# Patient Record
(demographics unavailable — no encounter records)

---

## 2024-10-28 NOTE — REVIEW OF SYSTEMS
[Palpitations] : palpitations [Arthralgias] : arthralgias [Breast Pain] : breast pain [Negative] : Heme/Lymph

## 2024-11-04 NOTE — HISTORY OF PRESENT ILLNESS
[FreeTextEntry1] : This is a 75 yo female presenting today with right breast pain. Her daughter is Jaci Mejia. She had injury with her 3 yo grandson about 1 month ago. And since then she has had right breast soreness since then. She may have had some soreness prior to his elbow hitting her. She had right fatty cells removed by Dr. Adhikari twice about 30 years ago. Her breast pain has resolved.  She does SBE She has not noticed a change in her breast or a breast lump. She has not noticed a change in her nipple or nipple area. She has not noticed a change in the skin of the breast. She is not experiencing nipple discharge. She is not experiencing breast pain. She has not noticed a lump or lymph node under the armpit.   BREAST CANCER RISK FACTORS Menarche:  15 Menopause: 51 Grav:  2       Para: 2 Age at first live birth: 19  Nursed:  no Hysterectomy: no Oophorectomy: no OCP: yes 5y HRT: no  Last pap/pelvic exam: 2021 WNL Related family history:  no Ashkenazi: no Mastery risk assessment: BRCAPRO 4.5%, TCv7 3.2%, TCv8 3.2%, Prema 2.9%, Naseem NA Genetic testing: no Bra size: D   Last mammogram: 07/19/2024   Location: [place]  Report reviewed.           Images reviewed. Results: BIRADS 2  There are scattered areas of fibroglandular density.  No mammographic evidence of malignancy.   Last ultrasound: 07/19/2024     Location: [place]  Report reviewed.       Images reviewed. Results: BIRADS 2  No sonographic evidence of malignancy.    Last MRI:  none

## 2024-11-04 NOTE — HISTORY OF PRESENT ILLNESS
[FreeTextEntry1] : This is a 73 yo female presenting today with right breast pain. Her daughter is aJci Mejia. She had injury with her 3 yo grandson about 1 month ago. And since then she has had right breast soreness since then. She may have had some soreness prior to his elbow hitting her. She had right fatty cells removed by Dr. Adhikari twice about 30 years ago. Her breast pain has resolved.  She does SBE She has not noticed a change in her breast or a breast lump. She has not noticed a change in her nipple or nipple area. She has not noticed a change in the skin of the breast. She is not experiencing nipple discharge. She is not experiencing breast pain. She has not noticed a lump or lymph node under the armpit.   BREAST CANCER RISK FACTORS Menarche:  15 Menopause: 51 Grav:  2       Para: 2 Age at first live birth: 19  Nursed:  no Hysterectomy: no Oophorectomy: no OCP: yes 5y HRT: no  Last pap/pelvic exam: 2021 WNL Related family history:  no Ashkenazi: no Mastery risk assessment: BRCAPRO 4.5%, TCv7 3.2%, TCv8 3.2%, Prema 2.9%, Naseem NA Genetic testing: no Bra size: D   Last mammogram: 07/19/2024   Location: [place]  Report reviewed.           Images reviewed. Results: BIRADS 2  There are scattered areas of fibroglandular density.  No mammographic evidence of malignancy.   Last ultrasound: 07/19/2024     Location: [place]  Report reviewed.       Images reviewed. Results: BIRADS 2  No sonographic evidence of malignancy.    Last MRI:  none

## 2024-11-04 NOTE — PHYSICAL EXAM
[Normocephalic] : normocephalic [Atraumatic] : atraumatic [Supple] : supple [No Supraclavicular Adenopathy] : no supraclavicular adenopathy [Examined in the supine and seated position] : examined in the supine and seated position [Asymmetrical] : asymmetrical [Grade 3] : Ptosis Grade 3 [No dominant masses] : no dominant masses in right breast  [No dominant masses] : no dominant masses left breast [No Nipple Retraction] : no left nipple retraction [No Nipple Discharge] : no left nipple discharge [No Axillary Lymphadenopathy] : no left axillary lymphadenopathy [No Edema] : no edema [No Rashes] : no rashes [No Ulceration] : no ulceration [de-identified] : R>>L, there is some possible swelling at LIQ IMF in area of patient concern

## 2024-11-04 NOTE — ASSESSMENT
[FreeTextEntry1] : 75 yo female with right breast pain resolved plan mg/sono july 2025 rec arnica gel prn We reviewed risk reduction strategies including maintaining a BMI <25, limiting red meat intake and alcoholic beverages to 3 per week and exercise (150 min/ week low intensity or 75 min/week high intensity). And maintaining a normal vitamin D level  and stress management strategies.  She knows to call or return sooner should any concerns or questions arise.

## 2024-11-04 NOTE — PHYSICAL EXAM
[Normocephalic] : normocephalic [Atraumatic] : atraumatic [Supple] : supple [No Supraclavicular Adenopathy] : no supraclavicular adenopathy [Examined in the supine and seated position] : examined in the supine and seated position [Asymmetrical] : asymmetrical [Grade 3] : Ptosis Grade 3 [No dominant masses] : no dominant masses in right breast  [No dominant masses] : no dominant masses left breast [No Nipple Retraction] : no left nipple retraction [No Nipple Discharge] : no left nipple discharge [No Axillary Lymphadenopathy] : no left axillary lymphadenopathy [No Edema] : no edema [No Rashes] : no rashes [No Ulceration] : no ulceration [de-identified] : R>>L, there is some possible swelling at LIQ IMF in area of patient concern

## 2024-12-12 NOTE — PHYSICAL EXAM
[Well Developed] : well developed [Well Nourished] : well nourished [No Acute Distress] : no acute distress [Normal Venous Pressure] : normal venous pressure [No Carotid Bruit] : no carotid bruit [Normal S1, S2] : normal S1, S2 [No Murmur] : no murmur [No Rub] : no rub [No Gallop] : no gallop [Clear Lung Fields] : clear lung fields [Good Air Entry] : good air entry [No Respiratory Distress] : no respiratory distress  [Soft] : abdomen soft [Non Tender] : non-tender [No Masses/organomegaly] : no masses/organomegaly [Normal Bowel Sounds] : normal bowel sounds [Normal Gait] : normal gait [No Edema] : no edema [No Cyanosis] : no cyanosis [No Clubbing] : no clubbing [No Varicosities] : no varicosities [No Rash] : no rash [No Skin Lesions] : no skin lesions [Moves all extremities] : moves all extremities [No Focal Deficits] : no focal deficits [Normal Speech] : normal speech [Alert and Oriented] : alert and oriented [Normal memory] : normal memory [General Appearance - Well Developed] : well developed [Normal Appearance] : normal appearance [Well Groomed] : well groomed [General Appearance - Well Nourished] : well nourished [No Deformities] : no deformities [General Appearance - In No Acute Distress] : no acute distress [Normal Conjunctiva] : the conjunctiva exhibited no abnormalities [Eyelids - No Xanthelasma] : the eyelids demonstrated no xanthelasmas [Normal Oral Mucosa] : normal oral mucosa [No Oral Pallor] : no oral pallor [No Oral Cyanosis] : no oral cyanosis [Normal Jugular Venous A Waves Present] : normal jugular venous A waves present [Normal Jugular Venous V Waves Present] : normal jugular venous V waves present [No Jugular Venous Ingacio A Waves] : no jugular venous ignacio A waves [Exaggerated Use Of Accessory Muscles For Inspiration] : no accessory muscle use [Respiration, Rhythm And Depth] : normal respiratory rhythm and effort [Auscultation Breath Sounds / Voice Sounds] : lungs were clear to auscultation bilaterally [Abdomen Soft] : soft [Abdomen Tenderness] : non-tender [Abdomen Mass (___ Cm)] : no abdominal mass palpated [Abnormal Walk] : normal gait [Gait - Sufficient For Exercise Testing] : the gait was sufficient for exercise testing [Nail Clubbing] : no clubbing of the fingernails [Cyanosis, Localized] : no localized cyanosis [Petechial Hemorrhages (___cm)] : no petechial hemorrhages [Skin Color & Pigmentation] : normal skin color and pigmentation [] : no rash [No Venous Stasis] : no venous stasis [Skin Lesions] : no skin lesions [No Skin Ulcers] : no skin ulcer [No Xanthoma] : no  xanthoma was observed [Oriented To Time, Place, And Person] : oriented to person, place, and time [Affect] : the affect was normal [Mood] : the mood was normal [No Anxiety] : not feeling anxious [de-identified] : 2/6 [employer] at [employer] and [employer]

## 2024-12-12 NOTE — ASSESSMENT
[FreeTextEntry1] : ## Elevated D Dimer Had chest pain ~2 weeks ago-> went to ED-> which showed D Dimer ~ 800s CT chest- NO PE No prior h/o DVT, PE Has had "superficial vein thrombosis" in LE which resolved after vein stripping No personal or family h/o miscarriages Her daughter had DVT. Not sure if she had blood work. Daughter was on birth control Patient never was on birth control Has SLE since 2017-> on plaquenil, azathioprine Mammogram 2021- normal. Was recommend repeat in 12 months Colonoscopy 2016 at [facility]-> had benign polyps. Was scheduled for repeat in 5 years but had to cancel due to COVID 9/2024 CTA and doppler - neg for clot  Patient is here for follow up and review labs Currently on Dicyclomine for IBS  -Labs are drawn in the office, reviewed, analyzed, and discussed D dimer remains elevated  - stressed on signs and symptoms of VTEs  Given she never had CT A/P, she is agreeable to have it done to r/o other potential etiology of elevated D dimer Explained that her D-Dimer can be elevated due inflammation/infection but close signs and symptoms of VTEs stressed. She voices understanding.  -Encouraged to be uptodate with screenings  #hypokalemia K 2.7 - been taking KCL 20meq 2x/day since Monday 12/9/2024 (was on KCL 20meq 3x/day) by her cardiologist Advised to go to closest ER ( [facility])to have K repletion as needed and she voices understanding.   #low iron - Ferritin - 17--> 33 (improved since she started on Slow FE) s/p colonoscopy with GI 12/11/2024  -Labs are drawn in the office, reviewed, analyzed, and discussed Hgb is acceptable, Ferritin is pending - will f/u with results.   Patient and her daughter had multiple questions which were answered to satisfaction  d/w Dr. Gomez  rtc in 6 months for follow up CBC, CMP, Iron panel, Ferritin, D dimer.

## 2024-12-12 NOTE — REASON FOR VISIT
[FreeTextEntry1] : 1. Essential Hypertension.  2. Treated Hypothyroidism.  3. Anemia. Iron Def-per heme 4. Hyperlipidemia.  5. Limited Scleroderma/CREST.  6: Palpitation

## 2024-12-12 NOTE — HISTORY OF PRESENT ILLNESS
[de-identified] : Ms. Shanda Mejia is 73 years old female with elevated D dimer here for consultation, referred by Dr. Alexis accompanied by her daughter Jaci   Patient with hx of aortic stenosis, positive TEE, GERD, HTN, goiter, SLE, OA, osteoporosis, scleroderma who had 'hurting feeling' on her mid upper chest, went to [facility]  on 3/19/2024 with normal CT but had elevated D dimer of 828 Patient had multiple superficial blood clots in the past, had varicose stripping, seen by vascular surgeon Dr. Tinajero.  No personal or family  hx of miscarriage  3/19/2024 WBC 4.59 H/H 12.8/38.6 MCV 90.0 1/18/2024 H/H - 13.3/40.1 MCV 92  SurgHx Knee replacement in 2019 without any complication  FHx: Mother with cancer (spine??) M. aunt with lung cancer (smoker) M. uncle with lung cancer (smoker) 1st cousin with breast cancer   SocialHx: never smoked Denies any alcohol intake Denies any illicit drugs Lives with daughter Jaci  Colonoscopy - > 6 years ago Mammogram - 2019  Vaccinations: Covid + Flu (not this year) pneumonia/shingles - declined HCP - not yet addressed  Denies pain, diarrhea, bleeding, fever, SOB/JUAREZ,   [de-identified] : Patient is here for follow up  She is doing well and has no complaints,  She's been taking 20 meq KCL 2x/day since 12/9/20024, previously on KCL 20meq 3x/day as recommended by Dr. Carbajal (cardiology) Denies any signs or symptoms of VTEs. She's been taking slow FE daily  s/p colonoscopy on 12/9/2024

## 2024-12-12 NOTE — CONSULT LETTER
[Dear  ___] : Dear  [unfilled], [Consult Letter:] : I had the pleasure of evaluating your patient, [unfilled]. [Please see my note below.] : Please see my note below. [Consult Closing:] : Thank you very much for allowing me to participate in the care of this patient.  If you have any questions, please do not hesitate to contact me. [Sincerely,] : Sincerely, [FreeTextEntry3] : Gabino Gomez MD, MPH  of Medicine [facility] [employer] at [facility] Attending Physician  Hematology and Medical Oncology [facility]

## 2024-12-12 NOTE — REVIEW OF SYSTEMS
[Fatigue] : fatigue [Negative] : Allergic/Immunologic [FreeTextEntry2] : 10 point review of systems negative except as outlined in HPI

## 2024-12-12 NOTE — ASSESSMENT
[FreeTextEntry1] : Assessment Encounter for preventive health examination (V70.0) (Z00.00) Hypertension (401.9) (I10) Dyslipidemia (272.4) (E78.5) Acquired hypothyroidism (244.9) (E03.9) Aortic stenosis (424.1) (I35.0) H/O systemic lupus erythematosus (SLE) (710.0) (M32.9) Assessment:  1. Essential hypertension - I10 (Primary)  2. Mixed hyperlipidemia - E78.2  3. Hypothyroid - E03.9  4. Limited scleroderma - M34.9  5. Palpitations-recurrent with several workups in the past for the same thing.  Her symptoms are real and due to benign isolated PVCs.  Several prior echocardiograms have revealed normal LV function and no significant valve abnormalities.  In addition, 24-hour Holter monitor, and prolonged 2-week recordings have revealed no abnormalities aside from isolated ectopy.  Her physical exam today is unremarkable as is her EKG.  Will reassure patient, check labs, and see her again in 3 months.  Sooner if necessary. 6) Recurrent hypokalemia    Comments:  1) HTN is controlled, and we will therefore continue Nifedipine, Atenolol, HCT. However, leg edema is significant and likely related to nifedipine; previously reduced from 60 to 30 as BP has been well controlled, and edema better  2) Mixed hyperlipidemia and lipids are at goal.  3)Hypothyroidism, on replacement therapy, clinically euthyroid. Recent TFTs were good  4) Hepatitis C, s/p interferon, in remission, per GI  5)limited SCLERODERMA with Hyperproteinemia, Hypergammaglobulinemia, Polyclonal gammopathy, + TEE.  6) **Palpations, due to isolated, frequent, but LOW GRADE PVCs, as before. Related to Hypertensive heart dx  -They are chronic, low grade, associated with nl LV function, and no CAD--a very benign and low risk profile.  Already on BB. No other treatment is indicated or needed. Reassurance  7)***Aortic stenosis--murmur more prominent. But echo confirms AS is mild/minimal.  8) ***Recurrent hypokalemia related to chlorthalidone. Aggravated by colonoscopy and diarrhea. Will d/c Atenolol Chlorthalidone and replace with Atenolol and separate triamterene/hydrochloride 37.5/25

## 2024-12-12 NOTE — HISTORY OF PRESENT ILLNESS
[FreeTextEntry1] : The patient is a 74  year-old woman, recently retired, well known to me for many years of care.         Scenario:She has a known history of:        1) hypertension--on atenolol-chlorthalidone and Nifedipine.        2) mixed hyperlipidemia, on atorvastatin 10 mg.        3) hypothyroidism and is on replacement therapy.         4) she has a remote history of hepatitis C that was treated with interferon and she is now felt to be in remission. Her GI- DR. Agee said there was no recurrence, but she has a "fatty LIver" and to lose weight. Colonoscopy was normal.        ===========================================   Note: Recent labs reveal an elevated total protein. 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 I referred her to Dr. Trammell and subsequently rheumatology (DR. Alexis). TEE is positive at 1: 2560 (Homogeneous pattern) and Anti_DS DNA 1:1000. Diagnosis is LIMITED SCLERODERMA--formerly CREST Syndrome. Now, on Plaquenil.      =============================================================================    She reported previously that she was experiencing frequent and bothersome palpitations--she feels that "stress" at home may be aggravating this. She is aware of intermittent skips, normal beats, and a sense that her heart is racing and irregular. No chest pains. But recently, these symptoms have improved. Had uneventful bilateral cataract surgery.   Workup of these new symptoms: 1)MOST RECENT ECHO:   8/16/2024 Nl LV EF 65%. No LVH sclerotic Tri leaflet aortic valve. Peak gradient 17, mean 9.5, aortic valve area 1.59 cm there is no AS!! Normal mitral valve structure. Mild MR mild left atrial enlargement compared to prior study: 11/17/2022Sclerotic AV wih peak 22.89/mean 9. Very mild, trivial AS therefore: no significant change ================================================================================================================================ 2) Holter:3/14/19    NSR with average HR 73 ( 57--106)                                Frequent, monomorphic PVC's--isolated, and as bigeminy. ! couplet. No VT                                 single PACs                                 No pauses, events. ***3) 2 week Zio recording (August 2021): NSR throughout with average heart rate 68 (range 54--93). Rare isolated PACs Rare isolated PVCs No SVT, VT, or PAF. No pauses or advanced. Conclusion: Normal two-week Zio recording ====================================================================================== NB: Shanda lost her  to pancreatic cancer.     Today: She was seen yesterday by hematology and routine K+ was just 2.7 She went to [facility] and had Potassium administered, kept overnight, and discharged this AM with K+ up to 3.1 She has been having diarrhea and is seeing GI. Also had colonoscopy recently  Clinically: She is feeling well. Taking and tolerating medicines. Her appetite has remained stable, as has her sleeping pattern She saw Dr. Hellerman, and was treated for IBS by Dr. Jose Chen **But, noticing worsening lower leg edema which she feels may be making ambulation more difficult  ECG performed due to symptoms of palpitations, as part of this evaluation:  NSR   nl axis 1st degree AVB      Increased volage   NSC from last ecg

## 2024-12-12 NOTE — ASSESSMENT
[FreeTextEntry1] : ## Elevated D Dimer Had chest pain ~2 weeks ago-> went to ED-> which showed D Dimer ~ 800s CT chest- NO PE No prior h/o DVT, PE Has had "superficial vein thrombosis" in LE which resolved after vein stripping No personal or family h/o miscarriages Her daughter had DVT. Not sure if she had blood work. Daughter was on birth control Patient never was on birth control Has SLE since 2017-> on plaquenil, azathioprine Mammogram 2021- normal. Was recommend repeat in 12 months Colonoscopy 2016 at [facility]-> had benign polyps. Was scheduled for repeat in 5 years but had to cancel due to COVID 9/2024 CTA and doppler - neg for clot  Patient is here for follow up and review labs Currently on Dicyclomine for IBS  -Labs are drawn in the office, reviewed, analyzed, and discussed D dimer remains elevated  - stressed on signs and symptoms of VTEs  Given she never had CT A/P, she is agreeable to have it done to r/o other potential etiology of elevated D dimer Explained that her D-Dimer can be elevated due inflammation/infection but close signs and symptoms of VTEs stressed. She voices understanding.  -Encouraged to be uptodate with screenings  #hypokalemia K 2.7 - been taking KCL 20meq 2x/day since Monday 12/9/2024 (was on KCL 20meq 3x/day) by her cardiologist Advised to go to closest ER ( [facility])to have K repletion as needed and she voices understanding.   #low iron - Ferritin - 17--> 33 (improved since she started on Slow FE) s/p colonoscopy with GI 12/11/2024  -Labs are drawn in the office, reviewed, analyzed, and discussed Hgb is acceptable, Ferritin is pending - will f/u with results.   Patient and her daughter had multiple questions which were answered to satisfaction  d/w Dr. Gomez  rtc in 6 months for follow up CBC, CMP, Iron panel, Ferritin, D dimer.

## 2024-12-12 NOTE — CONSULT LETTER
[Dear  ___] : Dear  [unfilled], [Consult Letter:] : I had the pleasure of evaluating your patient, [unfilled]. [Please see my note below.] : Please see my note below. [Consult Closing:] : Thank you very much for allowing me to participate in the care of this patient.  If you have any questions, please do not hesitate to contact me. [Sincerely,] : Sincerely, [FreeTextEntry3] : Gabino Gomez MD, MPH  of Medicine [facility] at [facility] Attending Physician  Hematology and Medical Oncology [facility]

## 2024-12-12 NOTE — HISTORY OF PRESENT ILLNESS
[FreeTextEntry1] : The patient is a 74  year-old woman, recently retired, well known to me for many years of care.         Scenario:She has a known history of:        1) hypertension--on atenolol-chlorthalidone and Nifedipine.        2) mixed hyperlipidemia, on atorvastatin 10 mg.        3) hypothyroidism and is on replacement therapy.         4) she has a remote history of hepatitis C that was treated with interferon and she is now felt to be in remission. Her GI- DR. Agee said there was no recurrence, but she has a "fatty LIver" and to lose weight. Colonoscopy was normal.        ===========================================   Note: Recent labs reveal an elevated total protein. 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 I referred her to Dr. Trammell and subsequently rheumatology (DR. Alexis). TEE is positive at 1: 2560 (Homogeneous pattern) and Anti_DS DNA 1:1000. Diagnosis is LIMITED SCLERODERMA--formerly CREST Syndrome. Now, on Plaquenil.      =============================================================================    She reported previously that she was experiencing frequent and bothersome palpitations--she feels that "stress" at home may be aggravating this. She is aware of intermittent skips, normal beats, and a sense that her heart is racing and irregular. No chest pains. But recently, these symptoms have improved. Had uneventful bilateral cataract surgery.   Workup of these new symptoms: 1)MOST RECENT ECHO:   8/16/2024 Nl LV EF 65%. No LVH sclerotic Tri leaflet aortic valve. Peak gradient 17, mean 9.5, aortic valve area 1.59 cm there is no AS!! Normal mitral valve structure. Mild MR mild left atrial enlargement compared to prior study: 11/17/2022Sclerotic AV wih peak 22.89/mean 9. Very mild, trivial AS therefore: no significant change ================================================================================================================================ 2) Holter:3/14/19    NSR with average HR 73 ( 57--106)                                Frequent, monomorphic PVC's--isolated, and as bigeminy. ! couplet. No VT                                 single PACs                                 No pauses, events. ***3) 2 week Zio recording (August 2021): NSR throughout with average heart rate 68 (range 54--93). Rare isolated PACs Rare isolated PVCs No SVT, VT, or PAF. No pauses or advanced. Conclusion: Normal two-week Zio recording ====================================================================================== NB: Shanda lost her  to pancreatic cancer.     Today: She was seen yesterday by hematology and routine K+ was just 2.7 She went to [facility] and had Potassium administered, kept overnight, and discharged this AM with K+ up to 3.1 She has been having diarrhea and is seeing GI. Also had colonoscopy recently  Clinically: She is feeling well. Taking and tolerating medicines. Her appetite has remained stable, as has her sleeping pattern She saw Dr. Hellerman, and was treated for IBS by Dr. Joes Chen **But, noticing worsening lower leg edema which she feels may be making ambulation more difficult  ECG performed due to symptoms of palpitations, as part of this evaluation:  NSR   nl axis 1st degree AVB      Increased volage   NSC from last ecg

## 2024-12-12 NOTE — HISTORY OF PRESENT ILLNESS
[de-identified] : Ms. Shanda Mejia is 73 years old female with elevated D dimer here for consultation, referred by Dr. Alexis accompanied by her daughter Jaci   Patient with hx of aortic stenosis, positive TEE, GERD, HTN, goiter, SLE, OA, osteoporosis, scleroderma who had 'hurting feeling' on her mid upper chest, went to [facility]  on 3/19/2024 with normal CT but had elevated D dimer of 828 Patient had multiple superficial blood clots in the past, had varicose stripping, seen by vascular surgeon Dr. Tinajero.  No personal or family  hx of miscarriage  3/19/2024 WBC 4.59 H/H 12.8/38.6 MCV 90.0 1/18/2024 H/H - 13.3/40.1 MCV 92  SurgHx Knee replacement in 2019 without any complication  FHx: Mother with cancer (spine??) M. aunt with lung cancer (smoker) M. uncle with lung cancer (smoker) 1st cousin with breast cancer   SocialHx: never smoked Denies any alcohol intake Denies any illicit drugs Lives with daughter Jaci  Colonoscopy - > 6 years ago Mammogram - 2019  Vaccinations: Covid + Flu (not this year) pneumonia/shingles - declined HCP - not yet addressed  Denies pain, diarrhea, bleeding, fever, SOB/JUAREZ,   [de-identified] : Patient is here for follow up  She is doing well and has no complaints,  She's been taking 20 meq KCL 2x/day since 12/9/20024, previously on KCL 20meq 3x/day as recommended by Dr. Carbajal (cardiology) Denies any signs or symptoms of VTEs. She's been taking slow FE daily  s/p colonoscopy on 12/9/2024

## 2024-12-12 NOTE — PHYSICAL EXAM
[Well Developed] : well developed [Well Nourished] : well nourished [No Acute Distress] : no acute distress [Normal Venous Pressure] : normal venous pressure [No Carotid Bruit] : no carotid bruit [Normal S1, S2] : normal S1, S2 [No Murmur] : no murmur [No Rub] : no rub [No Gallop] : no gallop [Clear Lung Fields] : clear lung fields [Good Air Entry] : good air entry [No Respiratory Distress] : no respiratory distress  [Soft] : abdomen soft [Non Tender] : non-tender [No Masses/organomegaly] : no masses/organomegaly [Normal Bowel Sounds] : normal bowel sounds [Normal Gait] : normal gait [No Edema] : no edema [No Cyanosis] : no cyanosis [No Clubbing] : no clubbing [No Varicosities] : no varicosities [No Rash] : no rash [No Skin Lesions] : no skin lesions [Moves all extremities] : moves all extremities [No Focal Deficits] : no focal deficits [Normal Speech] : normal speech [Alert and Oriented] : alert and oriented [Normal memory] : normal memory [General Appearance - Well Developed] : well developed [Normal Appearance] : normal appearance [Well Groomed] : well groomed [General Appearance - Well Nourished] : well nourished [No Deformities] : no deformities [Normal Conjunctiva] : the conjunctiva exhibited no abnormalities [General Appearance - In No Acute Distress] : no acute distress [Eyelids - No Xanthelasma] : the eyelids demonstrated no xanthelasmas [Normal Oral Mucosa] : normal oral mucosa [No Oral Pallor] : no oral pallor [No Oral Cyanosis] : no oral cyanosis [Normal Jugular Venous A Waves Present] : normal jugular venous A waves present [Normal Jugular Venous V Waves Present] : normal jugular venous V waves present [No Jugular Venous Ignacio A Waves] : no jugular venous ignacio A waves [Exaggerated Use Of Accessory Muscles For Inspiration] : no accessory muscle use [Respiration, Rhythm And Depth] : normal respiratory rhythm and effort [Auscultation Breath Sounds / Voice Sounds] : lungs were clear to auscultation bilaterally [Abdomen Soft] : soft [Abdomen Tenderness] : non-tender [Abdomen Mass (___ Cm)] : no abdominal mass palpated [Abnormal Walk] : normal gait [Gait - Sufficient For Exercise Testing] : the gait was sufficient for exercise testing [Nail Clubbing] : no clubbing of the fingernails [Cyanosis, Localized] : no localized cyanosis [Petechial Hemorrhages (___cm)] : no petechial hemorrhages [Skin Color & Pigmentation] : normal skin color and pigmentation [] : no rash [No Venous Stasis] : no venous stasis [Skin Lesions] : no skin lesions [No Skin Ulcers] : no skin ulcer [No Xanthoma] : no  xanthoma was observed [Oriented To Time, Place, And Person] : oriented to person, place, and time [Affect] : the affect was normal [Mood] : the mood was normal [No Anxiety] : not feeling anxious [de-identified] : 2/[employer] and [employer]

## 2024-12-12 NOTE — CONSULT LETTER
[Dear  ___] : Dear  [unfilled], [Consult Letter:] : I had the pleasure of evaluating your patient, [unfilled]. [Please see my note below.] : Please see my note below. [Consult Closing:] : Thank you very much for allowing me to participate in the care of this patient.  If you have any questions, please do not hesitate to contact me. [Sincerely,] : Sincerely, [FreeTextEntry3] : Gabino Gomez MD, MPH  of Medicine [facility] at [facility] Attending Physician  Hematology and Medical Oncology [facility]

## 2024-12-12 NOTE — ASSESSMENT
[FreeTextEntry1] : ## Elevated D Dimer Had chest pain ~2 weeks ago-> went to ED-> which showed D Dimer ~ 800s CT chest- NO PE No prior h/o DVT, PE Has had "superficial vein thrombosis" in LE which resolved after vein stripping No personal or family h/o miscarriages Her daughter had DVT. Not sure if she had blood work. Daughter was on birth control Patient never was on birth control Has SLE since 2017-> on plaquenil, azathioprine Mammogram 2021- normal. Was recommend repeat in 12 months Colonoscopy 2016 at [facility]-> had benign polyps. Was scheduled for repeat in 5 years but had to cancel due to COVID 9/2024 CTA and doppler - neg for clot  Patient is here for follow up and review labs Currently on Dicyclomine for IBS  -Labs are drawn in the office, reviewed, analyzed, and discussed D dimer remains elevated  - stressed on signs and symptoms of VTEs  Given she never had CT A/P, she is agreeable to have it done to r/o other potential etiology of elevated D dimer Explained that her D-Dimer can be elevated due inflammation/infection but close signs and symptoms of VTEs stressed. She voices understanding.  -Encouraged to be uptodate with screenings  #hypokalemia K 2.7 - been taking KCL 20meq 2x/day since Monday 12/9/2024 (was on KCL 20meq 3x/day) by her cardiologist Advised to go to closest ER ( [facility])to have K repletion as needed and she voices understanding.   #low iron - Ferritin - 17--> 33 (improved since she started on Slow FE) s/p colonoscopy with GI 12/11/2024  -Labs are drawn in the office, reviewed, analyzed, and discussed Hgb is acceptable, Ferritin is pending - will f/u with results.   Patient and her daughter had multiple questions which were answered to satisfaction  d/w Dr. Gomez  rtc in 6 months for follow up CBC, CMP, Iron panel, Ferritin, D dimer.

## 2024-12-12 NOTE — IMPRESSION
[FreeTextEntry1] : Zio recording: \par  NSR with average HR 68 (range: 54-93)\par  Rare isolated PACs\par  Rare isolated PVCs\par  No SVT, No VT, No PAF\par  No pauses or events\par  Conc: normal 2 week recording

## 2024-12-12 NOTE — HISTORY OF PRESENT ILLNESS
[de-identified] : Ms. Shanda Mejia is 73 years old female with elevated D dimer here for consultation, referred by Dr. Alexis accompanied by her daughter Jaci   Patient with hx of aortic stenosis, positive TEE, GERD, HTN, goiter, SLE, OA, osteoporosis, scleroderma who had 'hurting feeling' on her mid upper chest, went to [facility]  on 3/19/2024 with normal CT but had elevated D dimer of 828 Patient had multiple superficial blood clots in the past, had varicose stripping, seen by vascular surgeon Dr. Tinajero.  No personal or family  hx of miscarriage  3/19/2024 WBC 4.59 H/H 12.8/38.6 MCV 90.0 1/18/2024 H/H - 13.3/40.1 MCV 92  SurgHx Knee replacement in 2019 without any complication  FHx: Mother with cancer (spine??) M. aunt with lung cancer (smoker) M. uncle with lung cancer (smoker) 1st cousin with breast cancer   SocialHx: never smoked Denies any alcohol intake Denies any illicit drugs Lives with daughter Jaci  Colonoscopy - > 6 years ago Mammogram - 2019  Vaccinations: Covid + Flu (not this year) pneumonia/shingles - declined HCP - not yet addressed  Denies pain, diarrhea, bleeding, fever, SOB/JUAREZ,   [de-identified] : Patient is here for follow up  She is doing well and has no complaints,  She's been taking 20 meq KCL 2x/day since 12/9/20024, previously on KCL 20meq 3x/day as recommended by Dr. Carbajal (cardiology) Denies any signs or symptoms of VTEs. She's been taking slow FE daily  s/p colonoscopy on 12/9/2024

## 2025-02-13 NOTE — PHYSICAL EXAM
[Well Developed] : well developed [Well Nourished] : well nourished [No Acute Distress] : no acute distress [Normal Venous Pressure] : normal venous pressure [No Carotid Bruit] : no carotid bruit [Normal S1, S2] : normal S1, S2 [No Murmur] : no murmur [No Rub] : no rub [No Gallop] : no gallop [Clear Lung Fields] : clear lung fields [Good Air Entry] : good air entry [No Respiratory Distress] : no respiratory distress  [Soft] : abdomen soft [Non Tender] : non-tender [No Masses/organomegaly] : no masses/organomegaly [Normal Bowel Sounds] : normal bowel sounds [Normal Gait] : normal gait [No Edema] : no edema [No Cyanosis] : no cyanosis [No Clubbing] : no clubbing [No Varicosities] : no varicosities [No Rash] : no rash [No Skin Lesions] : no skin lesions [Moves all extremities] : moves all extremities [No Focal Deficits] : no focal deficits [Normal Speech] : normal speech [Alert and Oriented] : alert and oriented [Normal memory] : normal memory [General Appearance - Well Developed] : well developed [Normal Appearance] : normal appearance [Well Groomed] : well groomed [General Appearance - Well Nourished] : well nourished [No Deformities] : no deformities [General Appearance - In No Acute Distress] : no acute distress [Normal Conjunctiva] : the conjunctiva exhibited no abnormalities [Eyelids - No Xanthelasma] : the eyelids demonstrated no xanthelasmas [Normal Oral Mucosa] : normal oral mucosa [No Oral Pallor] : no oral pallor [No Oral Cyanosis] : no oral cyanosis [Normal Jugular Venous A Waves Present] : normal jugular venous A waves present [Normal Jugular Venous V Waves Present] : normal jugular venous V waves present [No Jugular Venous Ignacio A Waves] : no jugular venous ignacio A waves [Respiration, Rhythm And Depth] : normal respiratory rhythm and effort [Exaggerated Use Of Accessory Muscles For Inspiration] : no accessory muscle use [Auscultation Breath Sounds / Voice Sounds] : lungs were clear to auscultation bilaterally [Abdomen Soft] : soft [Abdomen Tenderness] : non-tender [Abdomen Mass (___ Cm)] : no abdominal mass palpated [Abnormal Walk] : normal gait [Gait - Sufficient For Exercise Testing] : the gait was sufficient for exercise testing [Nail Clubbing] : no clubbing of the fingernails [Cyanosis, Localized] : no localized cyanosis [Petechial Hemorrhages (___cm)] : no petechial hemorrhages [Skin Color & Pigmentation] : normal skin color and pigmentation [] : no rash [No Venous Stasis] : no venous stasis [Skin Lesions] : no skin lesions [No Skin Ulcers] : no skin ulcer [No Xanthoma] : no  xanthoma was observed [Oriented To Time, Place, And Person] : oriented to person, place, and time [Affect] : the affect was normal [Mood] : the mood was normal [No Anxiety] : not feeling anxious [de-identified] : 2/[employer] and [employer]

## 2025-02-13 NOTE — PHYSICAL EXAM
[Well Developed] : well developed [Well Nourished] : well nourished [No Acute Distress] : no acute distress [Normal Venous Pressure] : normal venous pressure [No Carotid Bruit] : no carotid bruit [Normal S1, S2] : normal S1, S2 [No Murmur] : no murmur [No Rub] : no rub [No Gallop] : no gallop [Clear Lung Fields] : clear lung fields [Good Air Entry] : good air entry [No Respiratory Distress] : no respiratory distress  [Soft] : abdomen soft [Non Tender] : non-tender [No Masses/organomegaly] : no masses/organomegaly [Normal Bowel Sounds] : normal bowel sounds [Normal Gait] : normal gait [No Edema] : no edema [No Cyanosis] : no cyanosis [No Clubbing] : no clubbing [No Varicosities] : no varicosities [No Rash] : no rash [No Skin Lesions] : no skin lesions [Moves all extremities] : moves all extremities [No Focal Deficits] : no focal deficits [Normal Speech] : normal speech [Alert and Oriented] : alert and oriented [Normal memory] : normal memory [General Appearance - Well Developed] : well developed [Normal Appearance] : normal appearance [Well Groomed] : well groomed [General Appearance - Well Nourished] : well nourished [No Deformities] : no deformities [General Appearance - In No Acute Distress] : no acute distress [Normal Conjunctiva] : the conjunctiva exhibited no abnormalities [Eyelids - No Xanthelasma] : the eyelids demonstrated no xanthelasmas [Normal Oral Mucosa] : normal oral mucosa [No Oral Pallor] : no oral pallor [No Oral Cyanosis] : no oral cyanosis [Normal Jugular Venous A Waves Present] : normal jugular venous A waves present [Normal Jugular Venous V Waves Present] : normal jugular venous V waves present [No Jugular Venous Ignacio A Waves] : no jugular venous ignacio A waves [Respiration, Rhythm And Depth] : normal respiratory rhythm and effort [Exaggerated Use Of Accessory Muscles For Inspiration] : no accessory muscle use [Auscultation Breath Sounds / Voice Sounds] : lungs were clear to auscultation bilaterally [Abdomen Soft] : soft [Abdomen Tenderness] : non-tender [Abdomen Mass (___ Cm)] : no abdominal mass palpated [Abnormal Walk] : normal gait [Gait - Sufficient For Exercise Testing] : the gait was sufficient for exercise testing [Nail Clubbing] : no clubbing of the fingernails [Cyanosis, Localized] : no localized cyanosis [Petechial Hemorrhages (___cm)] : no petechial hemorrhages [Skin Color & Pigmentation] : normal skin color and pigmentation [] : no rash [No Venous Stasis] : no venous stasis [Skin Lesions] : no skin lesions [No Skin Ulcers] : no skin ulcer [No Xanthoma] : no  xanthoma was observed [Oriented To Time, Place, And Person] : oriented to person, place, and time [Affect] : the affect was normal [Mood] : the mood was normal [No Anxiety] : not feeling anxious [de-identified] : 2/[employer] and [employer]

## 2025-02-13 NOTE — HISTORY OF PRESENT ILLNESS
[FreeTextEntry1] : The patient is a 74  year-old woman, recently retired, well known to me for many years of care.         Scenario:She has a known history of:        1) hypertension--on atenolol-chlorthalidone and Nifedipine.        2) mixed hyperlipidemia, on atorvastatin 10 mg.        3) hypothyroidism and is on replacement therapy.         4) she has a remote history of hepatitis C that was treated with interferon and she is now felt to be in remission. Her GI- DR. Agee said there was no recurrence, but she has a "fatty LIver" and to lose weight. Colonoscopy was normal.        ===========================================   Note: Recent labs reveal an elevated total protein. 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 I referred her to Dr. Trammell and subsequently rheumatology (DR. Alexis). TEE is positive at 1: 2560 (Homogeneous pattern) and Anti_DS DNA 1:1000. Diagnosis is LIMITED SCLERODERMA--formerly CREST Syndrome. Now, on Plaquenil.      =============================================================================    She reported previously that she was experiencing frequent and bothersome palpitations--she feels that "stress" at home may be aggravating this. She is aware of intermittent skips, normal beats, and a sense that her heart is racing and irregular. No chest pains. But recently, these symptoms have improved. Had uneventful bilateral cataract surgery.   Workup of these new symptoms: 1)MOST RECENT ECHO:   8/16/2024 Nl LV EF 65%. No LVH sclerotic Tri leaflet aortic valve. Peak gradient 17, mean 9.5, aortic valve area 1.59 cm there is no AS!! Normal mitral valve structure. Mild MR mild left atrial enlargement compared to prior study: 11/17/2022Sclerotic AV wih peak 22.89/mean 9. Very mild, trivial AS therefore: no significant change ================================================================================================================================ 2) Holter:3/14/19    NSR with average HR 73 ( 57--106)                                Frequent, monomorphic PVC's--isolated, and as bigeminy. ! couplet. No VT                                 single PACs                                 No pauses, events. ***3) 2 week Zio recording (August 2021): NSR throughout with average heart rate 68 (range 54--93). Rare isolated PACs Rare isolated PVCs No SVT, VT, or PAF. No pauses or advanced. Conclusion: Normal two-week Zio recording ====================================================================================== NB: Shanda lost her  to pancreatic cancer.     Today: She was seen yesterday by hematology and routine K+ was just 2.7 She went to [facility] and had Potassium administered, kept overnight, and discharged this AM with K+ up to 3.1 She has been having diarrhea and is seeing GI. Also had colonoscopy recently  Clinically: She is feeling well. Taking and tolerating medicines. Her appetite has remained stable, as has her sleeping pattern She saw Dr. Hellerman, and was treated for IBS by Dr. Jose Chen **But, noticing worsening lower leg edema which she feels may be making ambulation more difficult  ECG performed due to symptoms of palpitations, as part of this evaluation:  NSR   nl axis 1st degree AVB      Increased volage   NSC from last ecg

## 2025-02-13 NOTE — HISTORY OF PRESENT ILLNESS
[FreeTextEntry1] : The patient is a 74  year-old woman, recently retired, well known to me for many years of care.         Scenario:She has a known history of:        1) hypertension--on atenolol-chlorthalidone and Nifedipine.        2) mixed hyperlipidemia, on atorvastatin 10 mg.        3) hypothyroidism and is on replacement therapy.         4) she has a remote history of hepatitis C that was treated with interferon and she is now felt to be in remission. Her GI- DR. Agee said there was no recurrence, but she has a "fatty LIver" and to lose weight. Colonoscopy was normal.        ===========================================   Note: Recent labs reveal an elevated total protein. 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 I referred her to Dr. Trammell and subsequently rheumatology (DR. Alexis). TEE is positive at 1: 2560 (Homogeneous pattern) and Anti_DS DNA 1:1000. Diagnosis is LIMITED SCLERODERMA--formerly CREST Syndrome. Now, on Plaquenil.      =============================================================================    She reported previously that she was experiencing frequent and bothersome palpitations--she feels that "stress" at home may be aggravating this. She is aware of intermittent skips, normal beats, and a sense that her heart is racing and irregular. No chest pains. But recently, these symptoms have improved. Had uneventful bilateral cataract surgery.   Workup of these new symptoms: 1)MOST RECENT ECHO:   8/16/2024 Nl LV EF 65%. No LVH sclerotic Tri leaflet aortic valve. Peak gradient 17, mean 9.5, aortic valve area 1.59 cm there is no AS!! Normal mitral valve structure. Mild MR mild left atrial enlargement compared to prior study: 11/17/2022Sclerotic AV wih peak 22.89/mean 9. Very mild, trivial AS therefore: no significant change ================================================================================================================================ 2) Holter:3/14/19    NSR with average HR 73 ( 57--106)                                Frequent, monomorphic PVC's--isolated, and as bigeminy. ! couplet. No VT                                 single PACs                                 No pauses, events. ***3) 2 week Zio recording (August 2021): NSR throughout with average heart rate 68 (range 54--93). Rare isolated PACs Rare isolated PVCs No SVT, VT, or PAF. No pauses or advanced. Conclusion: Normal two-week Zio recording ====================================================================================== NB: Shanda lost her  to pancreatic cancer.     Today: She was seen yesterday by hematology and routine K+ was just 2.7 She went to [facility] and had Potassium administered, kept overnight, and discharged this AM with K+ up to 3.1 She has been having diarrhea and is seeing GI. Also had colonoscopy recently  Clinically: She is feeling well. Taking and tolerating medicines. Her appetite has remained stable, as has her sleeping pattern She saw Dr. Hellerman, and was treated for IBS by Dr. Jose Chen **But, noticing worsening lower leg edema which she feels may be making ambulation more difficult  ECG performed due to symptoms of palpitations, as part of this evaluation:  NSR   nl axis 1st degree AVB      Increased volage   NSC from last ecg

## 2025-06-12 NOTE — HISTORY OF PRESENT ILLNESS
[FreeTextEntry1] : The patient is a 74  year-old woman, recently retired, well known to me for many years of care.         Scenario:She has a known history of:        1) hypertension--on atenolol-chlorthalidone and Nifedipine.        2) mixed hyperlipidemia, on atorvastatin 10 mg.        3) hypothyroidism and is on replacement therapy.         4) she has a remote history of hepatitis C that was treated with interferon and she is now felt to be in remission. Her GI- DR. Agee said there was no recurrence, but she has a "fatty LIver" and to lose weight. Colonoscopy was normal.        ===========================================   Note: Recent labs reveal an elevated total protein. 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 I referred her to Dr. Trammell and subsequently rheumatology (DR. Alexis). TEE is positive at 1: 2560 (Homogeneous pattern) and Anti_DS DNA 1:1000. Diagnosis is LIMITED SCLERODERMA--formerly CREST Syndrome. Now, on Plaquenil.      =============================================================================    She reported previously that she was experiencing frequent and bothersome palpitations--she feels that "stress" at home may be aggravating this. She is aware of intermittent skips, normal beats, and a sense that her heart is racing and irregular. No chest pains. But recently, these symptoms have improved. Had uneventful bilateral cataract surgery.   Workup of these new symptoms: 1)MOST RECENT ECHO:   8/16/2024 Nl LV EF 65%. No LVH sclerotic Tri leaflet aortic valve. Peak gradient 17, mean 9.5, aortic valve area 1.59 cm there is no AS!! Normal mitral valve structure. Mild MR mild left atrial enlargement compared to prior study: 11/17/2022Sclerotic AV wih peak 22.89/mean 9. Very mild, trivial AS therefore: no significant change ================================================================================================================================ 2) Holter:3/14/19    NSR with average HR 73 ( 57--106)                                Frequent, monomorphic PVC's--isolated, and as bigeminy. ! couplet. No VT                                 single PACs                                 No pauses, events. ***3) 2 week Zio recording (August 2021): NSR throughout with average heart rate 68 (range 54--93). Rare isolated PACs Rare isolated PVCs No SVT, VT, or PAF. No pauses or advanced. Conclusion: Normal two-week Zio recording ====================================================================================== NB: Shanda lost her  to pancreatic cancer.     Today: She was seen yesterday by hematology and routine K+ was just 2.7 She went to [facility] and had Potassium administered, kept overnight, and discharged this AM with K+ up to 3.1 She has been having diarrhea and is seeing GI. Also had colonoscopy recently  Clinically: She is feeling well. Taking and tolerating medicines. Her appetite has remained stable, as has her sleeping pattern She saw Dr. Hellerman, and was treated for IBS by Dr. Jose Chen **But, noticing worsening lower leg edema which she feels may be making ambulation more difficult  ECG performed due to symptoms of palpitations, as part of this evaluation:  NSR   nl axis 1st degree AVB      Increased volage   NSC from last ecg

## 2025-06-12 NOTE — ASSESSMENT
[FreeTextEntry1] : Assessment Encounter for preventive health examination (V70.0) (Z00.00) Hypertension (401.9) (I10) Dyslipidemia (272.4) (E78.5) Acquired hypothyroidism (244.9) (E03.9) Aortic stenosis (424.1) (I35.0) H/O systemic lupus erythematosus (SLE) (710.0) (M32.9) Assessment:  1. Essential hypertension - I10 (Primary)  2. Mixed hyperlipidemia - E78.2  3. Hypothyroid - E03.9  4. Limited scleroderma - M34.9  5. Palpitations-recurrent with several workups in the past for the same thing.  Her symptoms are real and due to benign isolated PVCs.  Several prior echocardiograms have revealed normal LV function and no significant valve abnormalities.  In addition, 24-hour Holter monitor, and prolonged 2-week recordings have revealed no abnormalities aside from isolated ectopy.  Her physical exam today is unremarkable as is her EKG.  Will reassure patient, check labs, and see her again in 3 months.  Sooner if necessary. 6) Recurrent hypokalemia    Comments:  1) HTN is controlled, and we will therefore continue Nifedipine, Atenolol, HCT. However, leg edema is significant and likely related to nifedipine; previously reduced from 60 to 30 as BP has been well controlled, and edema better  2) Mixed hyperlipidemia and lipids are at goal.  3)Hypothyroidism, on replacement therapy, clinically euthyroid. Recent TFTs were good  4) Hepatitis C, s/p interferon, in remission, per GI  5)limited SCLERODERMA with Hyperproteinemia, Hypergammaglobulinemia, Polyclonal gammopathy, + TEE.  6) **Palpations, due to isolated, frequent, but LOW GRADE PVCs, as before. Related to Hypertensive heart dx  -They are chronic, low grade, associated with nl LV function, and no CAD--a very benign and low risk profile.  Already on BB. No other treatment is indicated or needed. Reassurance  7)***Aortic stenosis--murmur more prominent. But echo confirms AS is mild/minimal. I have advised her to have a f/u Echo in 2026.  8) ***Recurrent hypokalemia related to chlorthalidone. Aggravated by colonoscopy and diarrhea. Will d/c Atenolol Chlorthalidone and replace with Atenolol and separate triamterene/hydrochloride 37.5/25   Today I informed Shanda both verbally and in writing that I will be retiring from practice effective mid-August.  I provided her with a follow-up telephone number where she can reach me for any questions after I retire.  In addition, I provided her with a list of local [facility] cardiologists who she can follow-up with and will have complete access to this medical record.

## 2025-06-12 NOTE — HISTORY OF PRESENT ILLNESS
[FreeTextEntry1] : The patient is a 74  year-old woman, recently retired, well known to me for many years of care.         Scenario:She has a known history of:        1) hypertension--on atenolol-chlorthalidone and Nifedipine.        2) mixed hyperlipidemia, on atorvastatin 10 mg.        3) hypothyroidism and is on replacement therapy.         4) she has a remote history of hepatitis C that was treated with interferon and she is now felt to be in remission. Her GI- DR. Agee said there was no recurrence, but she has a "fatty LIver" and to lose weight. Colonoscopy was normal.        ===========================================   Note: Recent labs reveal an elevated total protein. 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 I referred her to Dr. Trammell and subsequently rheumatology (DR. Alexis). TEE is positive at 1: 2560 (Homogeneous pattern) and Anti_DS DNA 1:1000. Diagnosis is LIMITED SCLERODERMA--formerly CREST Syndrome. Now, on Plaquenil.      =============================================================================    She reported previously that she was experiencing frequent and bothersome palpitations--she feels that "stress" at home may be aggravating this. She is aware of intermittent skips, normal beats, and a sense that her heart is racing and irregular. No chest pains. But recently, these symptoms have improved. Had uneventful bilateral cataract surgery.   Workup of these new symptoms: 1)MOST RECENT ECHO:   8/16/2024 Nl LV EF 65%. No LVH sclerotic Tri leaflet aortic valve. Peak gradient 17, mean 9.5, aortic valve area 1.59 cm there is no AS!! Normal mitral valve structure. Mild MR mild left atrial enlargement compared to prior study: 11/17/2022Sclerotic AV wih peak 22.89/mean 9. Very mild, trivial AS therefore: no significant change ================================================================================================================================ 2) Holter:3/14/19    NSR with average HR 73 ( 57--106)                                Frequent, monomorphic PVC's--isolated, and as bigeminy. ! couplet. No VT                                 single PACs                                 No pauses, events. ***3) 2 week Zio recording (August 2021): NSR throughout with average heart rate 68 (range 54--93). Rare isolated PACs Rare isolated PVCs No SVT, VT, or PAF. No pauses or advanced. Conclusion: Normal two-week Zio recording ====================================================================================== NB: Shanda lost her  to pancreatic cancer.     Today: She was seen yesterday by hematology and routine K+ was just 2.7 She went to [facility] and had Potassium administered, kept overnight, and discharged this AM with K+ up to 3.1 She has been having diarrhea and is seeing GI. Also had colonoscopy recently  Clinically: She is feeling well. Taking and tolerating medicines. Her appetite has remained stable, as has her sleeping pattern She saw Dr. Hellerman, and was treated for IBS by Dr. Jose Chen **But, noticing worsening lower leg edema which she feels may be making ambulation more difficult  ECG performed due to symptoms of palpitations, as part of this evaluation:  NSR   nl axis 1st degree AVB      Increased volage   NSC from last ecg

## 2025-06-12 NOTE — ASSESSMENT
[FreeTextEntry1] : Assessment Encounter for preventive health examination (V70.0) (Z00.00) Hypertension (401.9) (I10) Dyslipidemia (272.4) (E78.5) Acquired hypothyroidism (244.9) (E03.9) Aortic stenosis (424.1) (I35.0) H/O systemic lupus erythematosus (SLE) (710.0) (M32.9) Assessment:  1. Essential hypertension - I10 (Primary)  2. Mixed hyperlipidemia - E78.2  3. Hypothyroid - E03.9  4. Limited scleroderma - M34.9  5. Palpitations-recurrent with several workups in the past for the same thing.  Her symptoms are real and due to benign isolated PVCs.  Several prior echocardiograms have revealed normal LV function and no significant valve abnormalities.  In addition, 24-hour Holter monitor, and prolonged 2-week recordings have revealed no abnormalities aside from isolated ectopy.  Her physical exam today is unremarkable as is her EKG.  Will reassure patient, check labs, and see her again in 3 months.  Sooner if necessary. 6) Recurrent hypokalemia    Comments:  1) HTN is controlled, and we will therefore continue Nifedipine, Atenolol, HCT. However, leg edema is significant and likely related to nifedipine; previously reduced from 60 to 30 as BP has been well controlled, and edema better  2) Mixed hyperlipidemia and lipids are at goal.  3)Hypothyroidism, on replacement therapy, clinically euthyroid. Recent TFTs were good  4) Hepatitis C, s/p interferon, in remission, per GI  5)limited SCLERODERMA with Hyperproteinemia, Hypergammaglobulinemia, Polyclonal gammopathy, + TEE.  6) **Palpations, due to isolated, frequent, but LOW GRADE PVCs, as before. Related to Hypertensive heart dx  -They are chronic, low grade, associated with nl LV function, and no CAD--a very benign and low risk profile.  Already on BB. No other treatment is indicated or needed. Reassurance  7)***Aortic stenosis--murmur more prominent. But echo confirms AS is mild/minimal. I have advised her to have a f/u Echo in 2026.  8) ***Recurrent hypokalemia related to chlorthalidone. Aggravated by colonoscopy and diarrhea. Will d/c Atenolol Chlorthalidone and replace with Atenolol and separate triamterene/hydrochloride 37.5/25   Today I informed Shanda both verbally and in writing that I will be retiring from practice effective mid-August.  I provided her with a follow-up telephone number where she can reach me for any questions after I retire.  In addition, I provided her with a list of local [facility] cardiologists who she can follow-up with and will have complete access to this medical record.

## 2025-06-19 NOTE — HISTORY OF PRESENT ILLNESS
[de-identified] : Ms. Shanda Mejia is 73 years old female with elevated D dimer here for consultation, referred by Dr. Alexis accompanied by her daughter Jaci   Patient with hx of aortic stenosis, positive TEE, GERD, HTN, goiter, SLE, OA, osteoporosis, scleroderma who had 'hurting feeling' on her mid upper chest, went to [facility]  on 3/19/2024 with normal CT but had elevated D dimer of 828 Patient had multiple superficial blood clots in the past, had varicose stripping, seen by vascular surgeon Dr. Tinajero.  No personal or family  hx of miscarriage  3/19/2024 WBC 4.59 H/H 12.8/38.6 MCV 90.0 1/18/2024 H/H - 13.3/40.1 MCV 92  SurgHx Knee replacement in 2019 without any complication  FHx: Mother with cancer (spine??) M. aunt with lung cancer (smoker) M. uncle with lung cancer (smoker) 1st cousin with breast cancer   SocialHx: never smoked Denies any alcohol intake Denies any illicit drugs Lives with daughter Jaci  Colonoscopy - > 6 years ago Mammogram - 2019  Vaccinations: Covid + Flu (not this year) pneumonia/shingles - declined HCP - not yet addressed  Denies pain, diarrhea, bleeding, fever, SOB/JUAREZ,   [de-identified] : Patient is here for follow up  She is doing well except for GI symptoms from gastritis and IBS, on Omeprazole and seen by GI.  Seh's now on KCL 20 meq bid and stopped oral iron supplement since Dec 2024.

## 2025-06-19 NOTE — ASSESSMENT
[FreeTextEntry1] : ## Elevated D Dimer Had chest pain ~2 weeks ago-> went to ED-> which showed D Dimer ~ 800s CT chest- NO PE Had "superficial vein thrombosis" in LE 20 yrs ago which resolved after vein stripping No personal or family h/o miscarriages Her daughter had DVT. Not sure if she had blood work. Daughter was on birth control Patient never was on birth control Has SLE since 2017-> on plaquenil, azathioprine Mammogram 2021- normal. Was recommend repeat in 12 months Colonoscopy 2016 at [facility]-> had benign polyps. Was scheduled for repeat in 5 years but had to cancel due to COVID 9/2024 CTA and doppler - neg for clot 1/27/2025 CT A/P - unremarkable  to follow up with GI for IBS and gastritis and continue with Omprezole s/p Colonoscopy 12/2024  Re-assured patient and daughter that her elevated d dimer 2/2 to inflammation/infection and we can stop rechecking but to closely monitor for signs and symptoms of VTEs.   #hypokalemia- on Lasix 20 mg bid with cardiology -Labs are drawn in the office, reviewed, analyzed, and discussed to continue with same dosing  #low iron - Ferritin - 17--> 33 --> 60 - improved with slow FE, been off oral supplement since Dec 2024.  s/p colonoscopy with GI 12/11/2024  -Labs are drawn in the office, reviewed, analyzed, and discussed Hgb is acceptable, Ferritin is pending - will f/u with results.   -Encouraged to be uptodate with screenings  Patient and her daughter had multiple questions which were answered to satisfaction  d/w Dr. Gomez  to follow up with PCP from now and follow up with us prn. CBC, CMP, Iron panel, Ferritin, D dimer.

## 2025-06-19 NOTE — HISTORY OF PRESENT ILLNESS
[de-identified] : Ms. Shanda Mejia is 73 years old female with elevated D dimer here for consultation, referred by Dr. Alexis accompanied by her daughter Jaci   Patient with hx of aortic stenosis, positive TEE, GERD, HTN, goiter, SLE, OA, osteoporosis, scleroderma who had 'hurting feeling' on her mid upper chest, went to [facility]  on 3/19/2024 with normal CT but had elevated D dimer of 828 Patient had multiple superficial blood clots in the past, had varicose stripping, seen by vascular surgeon Dr. Tinajero.  No personal or family  hx of miscarriage  3/19/2024 WBC 4.59 H/H 12.8/38.6 MCV 90.0 1/18/2024 H/H - 13.3/40.1 MCV 92  SurgHx Knee replacement in 2019 without any complication  FHx: Mother with cancer (spine??) M. aunt with lung cancer (smoker) M. uncle with lung cancer (smoker) 1st cousin with breast cancer   SocialHx: never smoked Denies any alcohol intake Denies any illicit drugs Lives with daughter Jaci  Colonoscopy - > 6 years ago Mammogram - 2019  Vaccinations: Covid + Flu (not this year) pneumonia/shingles - declined HCP - not yet addressed  Denies pain, diarrhea, bleeding, fever, SOB/JUAREZ,   [de-identified] : Patient is here for follow up  She is doing well except for GI symptoms from gastritis and IBS, on Omeprazole and seen by GI.  Seh's now on KCL 20 meq bid and stopped oral iron supplement since Dec 2024.

## 2025-06-19 NOTE — CONSULT LETTER
[FreeTextEntry3] : Gabino Gomez MD, MPH  of Medicine [facility] [employer] [facility] at [facility] Attending Physician  Hematology and Medical Oncology [facility]

## 2025-06-19 NOTE — ASSESSMENT
[FreeTextEntry1] : ## Elevated D Dimer Had chest pain ~2 weeks ago-> went to ED-> which showed D Dimer ~ 800s CT chest- NO PE Had "superficial vein thrombosis" in LE 20 yrs ago which resolved after vein stripping No personal or family h/o miscarriages Her daughter had DVT. Not sure if she had blood work. Daughter was on birth control Patient never was on birth control Has SLE since 2017-> on plaquenil, azathioprine Mammogram 2021- normal. Was recommend repeat in 12 months Colonoscopy 2016 at [facility]-> had benign polyps. Was scheduled for repeat in 5 years but had to cancel due to COVID 9/2024 CTA and doppler - neg for clot 1/27/2025 CT A/P - unremarkable  to follow up with GI for IBS and gastritis and continue with Omprezole s/p Colonoscopy 12/2024  Re-assured patient and daughter that her elevated d dimer 2/2 to inflammation/infection and we can stop rechecking but to closely monitor for signs and symptoms of VTEs.   #hypokalemia- on Lasix 20 mg bid with cardiology -Labs are drawn in the office, reviewed, analyzed, and discussed to continue with same dosing  #low iron - Ferritin - 17--> 33 --> 60 - improved with slow FE, been off oral supplement since Dec 2024.  s/p colonoscopy with GI 12/11/2024  -Labs are drawn in the office, reviewed, analyzed, and discussed Hgb is acceptable, Ferritin is pending - will f/u with results.   -Encouraged to be uptodate with screenings  Patient and her daughter had multiple questions which were answered to satisfaction  d/w Dr. Gomez  to follow up with PCP from now and follow up with us prn. CBC, CMP, Iron panel, Ferritin, D dimer.

## 2025-06-19 NOTE — CONSULT LETTER
[FreeTextEntry3] : Gabino Gomez MD, MPH  of Medicine [facility] [employer] [facility] at [facility] Attending Physician  Hematology and Medical Oncology [facility]